# Patient Record
Sex: FEMALE | Race: WHITE | NOT HISPANIC OR LATINO | Employment: STUDENT | URBAN - METROPOLITAN AREA
[De-identification: names, ages, dates, MRNs, and addresses within clinical notes are randomized per-mention and may not be internally consistent; named-entity substitution may affect disease eponyms.]

---
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
## 2025-01-22 ENCOUNTER — OFFICE VISIT (OUTPATIENT)
Dept: URGENT CARE | Age: 20
End: 2025-01-22
Payer: COMMERCIAL

## 2025-01-22 VITALS
HEART RATE: 86 BPM | DIASTOLIC BLOOD PRESSURE: 79 MMHG | OXYGEN SATURATION: 99 % | RESPIRATION RATE: 16 BRPM | SYSTOLIC BLOOD PRESSURE: 118 MMHG

## 2025-01-22 DIAGNOSIS — R82.90 ABNORMAL URINALYSIS: ICD-10-CM

## 2025-01-22 DIAGNOSIS — N94.6 DYSMENORRHEA: ICD-10-CM

## 2025-01-22 DIAGNOSIS — R30.0 DYSURIA: ICD-10-CM

## 2025-01-22 DIAGNOSIS — Z20.2 EXPOSURE TO CHLAMYDIA: Primary | ICD-10-CM

## 2025-01-22 LAB
POC APPEARANCE, URINE: CLEAR
POC BILIRUBIN, URINE: ABNORMAL
POC BLOOD, URINE: NEGATIVE
POC COLOR, URINE: ABNORMAL
POC GLUCOSE, URINE: NEGATIVE MG/DL
POC KETONES, URINE: ABNORMAL MG/DL
POC LEUKOCYTES, URINE: ABNORMAL
POC NITRITE,URINE: POSITIVE
POC PH, URINE: 5.5 PH
POC PROTEIN, URINE: ABNORMAL MG/DL
POC SPECIFIC GRAVITY, URINE: >=1.03
POC UROBILINOGEN, URINE: 4 EU/DL
PREGNANCY TEST URINE, POC: NEGATIVE

## 2025-01-22 PROCEDURE — 87086 URINE CULTURE/COLONY COUNT: CPT

## 2025-01-22 RX ORDER — DOXYCYCLINE 100 MG/1
100 CAPSULE ORAL 2 TIMES DAILY
Qty: 20 CAPSULE | Refills: 0 | Status: SHIPPED | OUTPATIENT
Start: 2025-01-22 | End: 2025-02-01

## 2025-01-22 ASSESSMENT — ENCOUNTER SYMPTOMS
DYSURIA: 1
FLANK PAIN: 0
CHILLS: 0
DIARRHEA: 0
FREQUENCY: 1
BACK PAIN: 0
HEMATURIA: 0
ABDOMINAL DISTENTION: 0
NAUSEA: 0
SHORTNESS OF BREATH: 0
VOMITING: 0
FEVER: 0

## 2025-01-22 NOTE — PROGRESS NOTES
Subjective   Patient ID: Shalini Contreras is a 20 y.o. female. They present today with a chief complaint of Difficulty Urinating (Dysuria, urgency, frequency, x2days).    History of Present Illness  Patient presents for new onset of green vaginal discharge, cramping and urinary symptoms. Claims urgency, frequency and burning with urination. Took azo. Patient was seen by pcp on virtual visit for 3 weeks of missed period with several negative pregnancy test at home. Pcp advised follow up with obgyn if symptoms persist. Since virtual visit patient has found out that her partner tested positive for chlamydia, no other positive testing and patient explains that she does not have any other partners. Denies fever, chills, sweats. Denies n/v/d. Denies chest pain, sob. Denies back pain, flank pain.      Difficulty Urinating  Associated symptoms: vaginal discharge    Associated symptoms: no fever, no flank pain, no nausea and no vomiting        Past Medical History  Allergies as of 01/22/2025    (No Known Allergies)       (Not in a hospital admission)       Past Medical History:   Diagnosis Date    Concussion with no loss of consciousness 02/23/2024       History reviewed. No pertinent surgical history.     reports that she has never smoked. She has never used smokeless tobacco. She reports that she does not drink alcohol and does not use drugs.    Review of Systems  Review of Systems   Constitutional:  Negative for chills and fever.   Respiratory:  Negative for shortness of breath.    Cardiovascular:  Negative for chest pain.   Gastrointestinal:  Negative for abdominal distention, diarrhea, nausea and vomiting.   Genitourinary:  Positive for dysuria, frequency, urgency and vaginal discharge. Negative for flank pain, hematuria, vaginal bleeding and vaginal pain.   Musculoskeletal:  Negative for back pain.                                  Objective    Vitals:    01/22/25 1332   BP: 118/79   Pulse: 86   Resp: 16   SpO2: 99%      No LMP recorded.    Physical Exam  Constitutional:       General: She is not in acute distress.     Appearance: She is not toxic-appearing.   Pulmonary:      Effort: Pulmonary effort is normal. No respiratory distress.      Breath sounds: Normal breath sounds. No wheezing.   Abdominal:      General: Abdomen is flat. There is no distension.      Palpations: Abdomen is soft.      Tenderness: There is no abdominal tenderness. There is no right CVA tenderness, left CVA tenderness, guarding or rebound.   Neurological:      Mental Status: She is alert.         Procedures    Point of Care Test & Imaging Results from this visit  Results for orders placed or performed in visit on 01/22/25   POCT UA Automated manually resulted   Result Value Ref Range    POC Color, Urine Orange (A) Straw, Yellow, Light-Yellow    POC Appearance, Urine Clear Clear    POC Glucose, Urine NEGATIVE NEGATIVE mg/dl    POC Bilirubin, Urine LARGE (3+) (A) NEGATIVE    POC Ketones, Urine TRACE (A) NEGATIVE mg/dl    POC Specific Gravity, Urine >=1.030 1.005 - 1.035    POC Blood, Urine NEGATIVE NEGATIVE    POC PH, Urine 5.5 No Reference Range Established PH    POC Protein, Urine TRACE (A) NEGATIVE mg/dl    POC Urobilinogen, Urine 4.0 (A) 0.2, 1.0 EU/DL    Poc Nitrite, Urine POSITIVE (A) NEGATIVE    POC Leukocytes, Urine LARGE (3+) (A) NEGATIVE   POCT pregnancy, urine manually resulted   Result Value Ref Range    Preg Test, Ur Negative Negative      No results found.    Diagnostic study results (if any) were reviewed by Ijeoma Jacobson PA-C.    Assessment/Plan   Allergies, medications, history, and pertinent labs/EKGs/Imaging reviewed by Ijeoma Jacobson PA-C.     Medical Decision Making   MDM- Patient with possible STD/exposure will be treated based on known chlamydia exposure. Uti symptoms as well, UA skewed by AZO in clinic, negative confirmed pregnancy test. Urine Cultures are obtained in office and pending at this time. Patient is counseled in  office on usage of protective measures and is advised should culture results return positive to contact all sexual partners. Patient is counseled to abstain from intercourse throughout duration of therapy. Will start on doxycyline at this time, any additional antibiotic treatment is pending results of urine culture. Patient advised to return to clinic or present to ED should new signs or symptoms occur. Otherwise follow with PCP. Patient verbalized understanding and agrees with plan.      Orders and Diagnoses  Diagnoses and all orders for this visit:  Exposure to chlamydia  -     doxycycline (Vibramycin) 100 mg capsule; Take 1 capsule (100 mg) by mouth 2 times a day for 10 days. Take with at least 8 ounces (large glass) of water, do not lie down for 30 minutes after  Dysuria  -     POCT UA Automated manually resulted  -     POCT pregnancy, urine manually resulted  -     Urine Culture  Abnormal urinalysis  -     Urine Culture  Dysmenorrhea  -     Referral to Gynecology; Future      Medical Admin Record      Patient disposition: Home    Electronically signed by Ijeoma Jacobson PA-C  1:49 PM

## 2025-01-22 NOTE — PATIENT INSTRUCTIONS
Follow up with obgyn    Continue using protection. No sex until you and partner complete antibiotic treatment    If you develop abdominal pain, fever, vomiting go to ER    Negative pregnancy test in clinic.

## 2025-01-24 LAB — BACTERIA UR CULT: NORMAL

## 2025-02-06 ENCOUNTER — LAB REQUISITION (OUTPATIENT)
Dept: LAB | Facility: HOSPITAL | Age: 20
End: 2025-02-06

## 2025-02-06 DIAGNOSIS — Z20.2 CONTACT WITH AND (SUSPECTED) EXPOSURE TO INFECTIONS WITH A PREDOMINANTLY SEXUAL MODE OF TRANSMISSION: ICD-10-CM

## 2025-02-06 PROCEDURE — 87205 SMEAR GRAM STAIN: CPT

## 2025-02-06 PROCEDURE — 87491 CHLMYD TRACH DNA AMP PROBE: CPT

## 2025-02-06 PROCEDURE — 87591 N.GONORRHOEAE DNA AMP PROB: CPT

## 2025-02-06 PROCEDURE — 87491 CHLMYD TRACH DNA AMP PROBE: CPT | Mod: OUT,PORLAB | Performed by: NURSE PRACTITIONER

## 2025-02-06 PROCEDURE — 87661 TRICHOMONAS VAGINALIS AMPLIF: CPT

## 2025-02-06 PROCEDURE — 87661 TRICHOMONAS VAGINALIS AMPLIF: CPT | Mod: OUT,PORLAB | Performed by: NURSE PRACTITIONER

## 2025-02-06 PROCEDURE — 87205 SMEAR GRAM STAIN: CPT | Mod: OUT,PORLAB | Performed by: NURSE PRACTITIONER

## 2025-02-07 LAB
C TRACH RRNA SPEC QL NAA+PROBE: NEGATIVE
CLUE CELLS VAG LPF-#/AREA: NORMAL /[LPF]
N GONORRHOEA DNA SPEC QL PROBE+SIG AMP: NEGATIVE
NUGENT SCORE: 1
T VAGINALIS RRNA SPEC QL NAA+PROBE: NEGATIVE
YEAST VAG WET PREP-#/AREA: NORMAL